# Patient Record
Sex: FEMALE | Race: WHITE | NOT HISPANIC OR LATINO | URBAN - METROPOLITAN AREA
[De-identification: names, ages, dates, MRNs, and addresses within clinical notes are randomized per-mention and may not be internally consistent; named-entity substitution may affect disease eponyms.]

---

## 2019-06-21 PROCEDURE — 88305 TISSUE EXAM BY PATHOLOGIST: CPT | Performed by: INTERNAL MEDICINE

## 2019-06-24 ENCOUNTER — LAB REQUISITION (OUTPATIENT)
Dept: LAB | Facility: HOSPITAL | Age: 69
End: 2019-06-24

## 2019-06-24 DIAGNOSIS — K25.3 ACUTE GASTRIC ULCER WITHOUT HEMORRHAGE OR PERFORATION: ICD-10-CM

## 2019-06-24 DIAGNOSIS — R19.8 OTHER SPECIFIED SYMPTOMS AND SIGNS INVOLVING THE DIGESTIVE SYSTEM AND ABDOMEN: ICD-10-CM

## 2019-06-24 DIAGNOSIS — R10.32 LEFT LOWER QUADRANT PAIN: ICD-10-CM

## 2019-06-24 DIAGNOSIS — R19.7 DIARRHEA: ICD-10-CM

## 2019-06-24 DIAGNOSIS — R13.10 DYSPHAGIA: ICD-10-CM

## 2019-06-25 LAB
LAB AP CASE REPORT: NORMAL
LAB AP DIAGNOSIS COMMENT: NORMAL
PATH REPORT.FINAL DX SPEC: NORMAL
PATH REPORT.GROSS SPEC: NORMAL

## 2023-08-22 ENCOUNTER — HOSPITAL ENCOUNTER (EMERGENCY)
Facility: HOSPITAL | Age: 73
Discharge: HOME | End: 2023-08-23
Attending: EMERGENCY MEDICINE
Payer: MEDICARE

## 2023-08-22 DIAGNOSIS — I10 HYPERTENSION, UNSPECIFIED TYPE: Primary | ICD-10-CM

## 2023-08-22 LAB
ALBUMIN SERPL-MCNC: 4.6 G/DL (ref 3.5–5.2)
ALBUMIN/GLOB SERPL: 2 G/DL
ALP SERPL-CCNC: 57 U/L (ref 39–117)
ALT SERPL W P-5'-P-CCNC: 17 U/L (ref 1–33)
ANION GAP SERPL CALCULATED.3IONS-SCNC: 10.5 MMOL/L (ref 5–15)
AST SERPL-CCNC: 19 U/L (ref 1–32)
BASOPHILS # BLD AUTO: 0.07 10*3/MM3 (ref 0–0.2)
BASOPHILS NFR BLD AUTO: 0.9 % (ref 0–1.5)
BILIRUB SERPL-MCNC: 0.5 MG/DL (ref 0–1.2)
BUN SERPL-MCNC: 10 MG/DL (ref 8–23)
BUN/CREAT SERPL: 15.4 (ref 7–25)
CALCIUM SPEC-SCNC: 10.6 MG/DL (ref 8.6–10.5)
CHLORIDE SERPL-SCNC: 93 MMOL/L (ref 98–107)
CO2 SERPL-SCNC: 28.5 MMOL/L (ref 22–29)
CREAT SERPL-MCNC: 0.65 MG/DL (ref 0.57–1)
DEPRECATED RDW RBC AUTO: 41.7 FL (ref 37–54)
EGFRCR SERPLBLD CKD-EPI 2021: 94.3 ML/MIN/1.73
EOSINOPHIL # BLD AUTO: 0.06 10*3/MM3 (ref 0–0.4)
EOSINOPHIL NFR BLD AUTO: 0.8 % (ref 0.3–6.2)
ERYTHROCYTE [DISTWIDTH] IN BLOOD BY AUTOMATED COUNT: 11.9 % (ref 12.3–15.4)
GLOBULIN UR ELPH-MCNC: 2.3 GM/DL
GLUCOSE SERPL-MCNC: 104 MG/DL (ref 65–99)
HCT VFR BLD AUTO: 42.7 % (ref 34–46.6)
HGB BLD-MCNC: 14.1 G/DL (ref 12–15.9)
IMM GRANULOCYTES # BLD AUTO: 0.01 10*3/MM3 (ref 0–0.05)
IMM GRANULOCYTES NFR BLD AUTO: 0.1 % (ref 0–0.5)
LYMPHOCYTES # BLD AUTO: 1.94 10*3/MM3 (ref 0.7–3.1)
LYMPHOCYTES NFR BLD AUTO: 25.1 % (ref 19.6–45.3)
MCH RBC QN AUTO: 31 PG (ref 26.6–33)
MCHC RBC AUTO-ENTMCNC: 33 G/DL (ref 31.5–35.7)
MCV RBC AUTO: 93.8 FL (ref 79–97)
MONOCYTES # BLD AUTO: 1.08 10*3/MM3 (ref 0.1–0.9)
MONOCYTES NFR BLD AUTO: 14 % (ref 5–12)
NEUTROPHILS NFR BLD AUTO: 4.58 10*3/MM3 (ref 1.7–7)
NEUTROPHILS NFR BLD AUTO: 59.1 % (ref 42.7–76)
NT-PROBNP SERPL-MCNC: 321.9 PG/ML (ref 0–900)
PLATELET # BLD AUTO: 377 10*3/MM3 (ref 140–450)
PMV BLD AUTO: 8.6 FL (ref 6–12)
POTASSIUM SERPL-SCNC: 3.3 MMOL/L (ref 3.5–5.2)
PROT SERPL-MCNC: 6.9 G/DL (ref 6–8.5)
RBC # BLD AUTO: 4.55 10*6/MM3 (ref 3.77–5.28)
SODIUM SERPL-SCNC: 132 MMOL/L (ref 136–145)
TROPONIN T SERPL HS-MCNC: 9 NG/L
WBC NRBC COR # BLD: 7.74 10*3/MM3 (ref 3.4–10.8)

## 2023-08-22 PROCEDURE — 84484 ASSAY OF TROPONIN QUANT: CPT | Performed by: EMERGENCY MEDICINE

## 2023-08-22 PROCEDURE — 80053 COMPREHEN METABOLIC PANEL: CPT | Performed by: EMERGENCY MEDICINE

## 2023-08-22 PROCEDURE — 85025 COMPLETE CBC W/AUTO DIFF WBC: CPT | Performed by: EMERGENCY MEDICINE

## 2023-08-22 PROCEDURE — 99283 EMERGENCY DEPT VISIT LOW MDM: CPT

## 2023-08-22 PROCEDURE — 83880 ASSAY OF NATRIURETIC PEPTIDE: CPT | Performed by: EMERGENCY MEDICINE

## 2023-08-22 PROCEDURE — 36415 COLL VENOUS BLD VENIPUNCTURE: CPT

## 2023-08-22 RX ORDER — CITALOPRAM 20 MG/1
20 TABLET ORAL DAILY
COMMUNITY
Start: 2023-07-24

## 2023-08-22 RX ORDER — MECLIZINE HYDROCHLORIDE 25 MG/1
25 TABLET ORAL ONCE
Status: COMPLETED | OUTPATIENT
Start: 2023-08-22 | End: 2023-08-22

## 2023-08-22 RX ORDER — PANTOPRAZOLE SODIUM 40 MG/10ML
INJECTION, POWDER, LYOPHILIZED, FOR SOLUTION INTRAVENOUS
COMMUNITY
End: 2023-08-28

## 2023-08-22 RX ORDER — PANTOPRAZOLE SODIUM 40 MG/1
TABLET, DELAYED RELEASE ORAL
COMMUNITY
Start: 2023-04-26

## 2023-08-22 RX ORDER — METOPROLOL SUCCINATE 25 MG/1
25 TABLET, EXTENDED RELEASE ORAL DAILY
COMMUNITY
Start: 2023-08-04

## 2023-08-22 RX ORDER — LEVETIRACETAM 500 MG/1
500 TABLET, EXTENDED RELEASE ORAL DAILY
COMMUNITY
Start: 2023-06-03

## 2023-08-22 RX ORDER — DILTIAZEM HYDROCHLORIDE 240 MG/1
240 CAPSULE, COATED, EXTENDED RELEASE ORAL DAILY
COMMUNITY
Start: 2023-06-08 | End: 2023-08-28

## 2023-08-22 RX ORDER — POTASSIUM CHLORIDE 1.5 G/1.58G
40 POWDER, FOR SOLUTION ORAL ONCE
Status: COMPLETED | OUTPATIENT
Start: 2023-08-22 | End: 2023-08-22

## 2023-08-22 RX ORDER — GABAPENTIN 300 MG/1
300 CAPSULE ORAL DAILY
COMMUNITY
Start: 2023-06-07

## 2023-08-22 RX ORDER — LEVOTHYROXINE SODIUM 0.07 MG/1
TABLET ORAL
COMMUNITY
Start: 2023-07-05

## 2023-08-22 RX ADMIN — POTASSIUM CHLORIDE 40 MEQ: 1.5 POWDER, FOR SOLUTION ORAL at 22:30

## 2023-08-22 RX ADMIN — MECLIZINE HYDROCHLORIDE 25 MG: 25 TABLET ORAL at 23:42

## 2023-08-23 VITALS
OXYGEN SATURATION: 96 % | BODY MASS INDEX: 24.24 KG/M2 | TEMPERATURE: 97.4 F | DIASTOLIC BLOOD PRESSURE: 93 MMHG | HEIGHT: 64 IN | HEART RATE: 75 BPM | RESPIRATION RATE: 16 BRPM | WEIGHT: 142 LBS | SYSTOLIC BLOOD PRESSURE: 161 MMHG

## 2023-08-23 LAB
GEN 5 2HR TROPONIN T REFLEX: 10 NG/L
TROPONIN T DELTA: 1 NG/L

## 2023-08-23 RX ORDER — MECLIZINE HYDROCHLORIDE 25 MG/1
12.5 TABLET ORAL 3 TIMES DAILY PRN
Qty: 15 TABLET | Refills: 0 | Status: SHIPPED | OUTPATIENT
Start: 2023-08-23

## 2023-08-23 NOTE — DISCHARGE INSTRUCTIONS
Keep a blood pressure diary. Follow-up with Dr Guardado for further evaluation. Return if problems.

## 2023-08-23 NOTE — FSED PROVIDER NOTE
Subjective   History of Present Illness  73 yof complains of high blood pressure. She reports her blood pressure was well controlled until a few months ago when her PCP changed her medications. She is unsure why her medication was changed. She denies headache, nausea, chest pain, shortness of breath, fatigue, or weakness. She recently saw an oncologist and may have cancer. She does not know what kind at this time and is still undergoing testing.     Review of Systems   Constitutional: Negative.    HENT: Negative.     Respiratory: Negative.     Cardiovascular: Negative.    Musculoskeletal: Negative.    Skin: Negative.    Neurological: Negative.    All other systems reviewed and are negative.    Past Medical History:   Diagnosis Date    Hypertension        No Known Allergies    History reviewed. No pertinent surgical history.    History reviewed. No pertinent family history.    Social History     Socioeconomic History    Marital status: Unknown   Tobacco Use    Smoking status: Former     Types: Cigarettes           Objective   Physical Exam  Vitals reviewed.   Constitutional:       Appearance: Normal appearance.   HENT:      Head: Normocephalic and atraumatic.      Right Ear: Tympanic membrane, ear canal and external ear normal.      Left Ear: Tympanic membrane, ear canal and external ear normal.      Nose: Nose normal.      Mouth/Throat:      Mouth: Mucous membranes are moist.      Pharynx: Oropharynx is clear.   Eyes:      Extraocular Movements: Extraocular movements intact.      Pupils: Pupils are equal, round, and reactive to light.   Cardiovascular:      Rate and Rhythm: Normal rate and regular rhythm.      Pulses: Normal pulses.      Heart sounds: Normal heart sounds.   Pulmonary:      Effort: Pulmonary effort is normal.      Breath sounds: Normal breath sounds.   Musculoskeletal:         General: Normal range of motion.      Cervical back: Normal range of motion and neck supple.   Skin:     General: Skin is  warm and dry.      Capillary Refill: Capillary refill takes less than 2 seconds.   Neurological:      General: No focal deficit present.      Mental Status: She is alert and oriented to person, place, and time.       Procedures           ED Course  ED Course as of 08/23/23 0246   Wed Aug 23, 2023   0058 Discussed test results and treatment plan with patient and family.  [BM]      ED Course User Index  [BM] Angelica Pichardo MD                                           Medical Decision Making  Patient presents to the emergency department complaining of high blood pressure. Patient is otherwise asymptomatic without confusion, chest pain, dysuria, vision changes, focal neurological deficit or SOB. Patient is hypertensive here. Doubt hypertenstive emergency, patient with no signs of AMS, pulmonary edema, heart failure, ACS, PRESS syndrome, intracranial hemorrhage, renal infarction or failure or other end organ damage. Plan to discharge patient home with PMD follow up.    Problems Addressed:  Hypertension, unspecified type: complicated acute illness or injury    Amount and/or Complexity of Data Reviewed  Labs: ordered.    Risk  Prescription drug management.        Final diagnoses:   Hypertension, unspecified type       ED Disposition  ED Disposition       ED Disposition   Discharge    Condition   Stable    Comment   --               PATIENT CONNECTION - [facility] [[email or web address]] [id number]  [phone]        Brady Guardado MD  [address] [facility] [place] [address]  [phone]               Medication List        New Prescriptions      meclizine 25 MG tablet  Commonly known as: ANTIVERT  Take 0.5 tablets by mouth 3 (Three) Times a Day As Needed for Dizziness.               Where to Get Your Medications        These medications were sent to [facility]/pharmacy #[id number] - [place], [place] - [address] [facility] - [phone]  - [phone] [address] [place] [address]      Hours: 24-hours Phone:  [phone]   meclizine 25 MG tablet

## 2023-08-28 ENCOUNTER — OFFICE VISIT (OUTPATIENT)
Dept: CARDIOLOGY | Facility: CLINIC | Age: 73
End: 2023-08-28
Payer: MEDICARE

## 2023-08-28 ENCOUNTER — TELEPHONE (OUTPATIENT)
Dept: CARDIOLOGY | Facility: CLINIC | Age: 73
End: 2023-08-28

## 2023-08-28 VITALS
HEART RATE: 65 BPM | OXYGEN SATURATION: 98 % | WEIGHT: 142 LBS | BODY MASS INDEX: 24.24 KG/M2 | DIASTOLIC BLOOD PRESSURE: 93 MMHG | HEIGHT: 64 IN | SYSTOLIC BLOOD PRESSURE: 142 MMHG

## 2023-08-28 DIAGNOSIS — R09.89 LABILE HYPERTENSION: Primary | ICD-10-CM

## 2023-08-28 DIAGNOSIS — E83.52 HYPERCALCEMIA: ICD-10-CM

## 2023-08-28 PROCEDURE — 93000 ELECTROCARDIOGRAM COMPLETE: CPT | Performed by: INTERNAL MEDICINE

## 2023-08-28 PROCEDURE — 1160F RVW MEDS BY RX/DR IN RCRD: CPT | Performed by: INTERNAL MEDICINE

## 2023-08-28 PROCEDURE — 99204 OFFICE O/P NEW MOD 45 MIN: CPT | Performed by: INTERNAL MEDICINE

## 2023-08-28 PROCEDURE — 1159F MED LIST DOCD IN RCRD: CPT | Performed by: INTERNAL MEDICINE

## 2023-08-28 RX ORDER — LOSARTAN POTASSIUM 100 MG/1
100 TABLET ORAL DAILY
Qty: 30 TABLET | Refills: 11 | Status: SHIPPED | OUTPATIENT
Start: 2023-08-28 | End: 2023-08-28

## 2023-08-28 RX ORDER — ERGOCALCIFEROL 1.25 MG/1
50000 CAPSULE ORAL
COMMUNITY
Start: 2023-08-23

## 2023-08-28 RX ORDER — MELATONIN
2000 DAILY
COMMUNITY
Start: 2023-08-23

## 2023-08-28 RX ORDER — LOSARTAN POTASSIUM 100 MG/1
100 TABLET ORAL DAILY
Qty: 90 TABLET | Refills: 3 | Status: SHIPPED | OUTPATIENT
Start: 2023-08-28

## 2023-08-28 NOTE — TELEPHONE ENCOUNTER
Rx Refill Note  Requested Prescriptions     Signed Prescriptions Disp Refills    losartan (COZAAR) 100 MG tablet 90 tablet 3     Sig: TAKE 1 TABLET BY MOUTH DAILY     Authorizing Provider: HAI PEREZ     Ordering User: MICAH NAVARRO      Last office visit with prescribing clinician: 8/28/2023   Last telemedicine visit with prescribing clinician: Visit date not found   Next office visit with prescribing clinician: 10/2/2023                           Micah Navarro MA  08/28/23, 13:17 EDT

## 2023-08-28 NOTE — PROGRESS NOTES
Cardiology Consult Note    Patient Identification:  Name: Maryellen Dumont  Age: 73 y.o.  Sex: female  :  1950  MRN: [id number]               History of Present Illness:        Assessment:  :      Recommendations / Plan:                   Diagnosis Plan   1. Labile hypertension  Ambulatory Referral to Endocrinology    Basic Metabolic Panel    ECG 12 Lead      2. Hypercalcemia  Ambulatory Referral to Endocrinology    Basic Metabolic Panel    ECG 12 Lead                 Past Medical History:  Past Medical History:   Diagnosis Date    Hypertension      Past Surgical History:  History reviewed. No pertinent surgical history.   Allergies:  No Known Allergies  Home Meds:  (Not in a hospital admission)    Current Meds:     Current Outpatient Medications:     Cholecalciferol 25 MCG (1000 UT) tablet, Take 2 tablets by mouth Daily., Disp: , Rfl:     citalopram (CeleXA) 20 MG tablet, Take 1 tablet by mouth Daily., Disp: , Rfl:     gabapentin (NEURONTIN) 300 MG capsule, Take 1 capsule by mouth Daily., Disp: , Rfl:     levETIRAcetam XR (KEPPRA XR) 500 MG 24 hr tablet, Take 1 tablet by mouth Daily., Disp: , Rfl:     levothyroxine (SYNTHROID, LEVOTHROID) 75 MCG tablet, TAKE 1 TABLET BY MOUTH EVERY DAY IN THE MORNING ON AN EMPTY STOMACH, Disp: , Rfl:     meclizine (ANTIVERT) 25 MG tablet, Take 0.5 tablets by mouth 3 (Three) Times a Day As Needed for Dizziness., Disp: 15 tablet, Rfl: 0    metoprolol succinate XL (TOPROL-XL) 25 MG 24 hr tablet, Take 1 tablet by mouth Daily., Disp: , Rfl:     pantoprazole (PROTONIX) 40 MG EC tablet, , Disp: , Rfl:     vitamin D (ERGOCALCIFEROL) 1.25 MG (06819 UT) capsule capsule, Take 1 capsule by mouth Every 7 (Seven) Days., Disp: , Rfl:     losartan (Cozaar) 100 MG tablet, Take 1 tablet by mouth Daily., Disp: 30 tablet, Rfl: 11  Social History:   Social History     Tobacco Use    Smoking status: Former     Types: Cigarettes    Smokeless tobacco: Never   Substance Use Topics    Alcohol use:  "Not on file      Family History:  History reviewed. No pertinent family history.     Review of Systems : Review of Systems   Constitutional: Negative for malaise/fatigue.   Cardiovascular:  Positive for leg swelling. Negative for chest pain, dyspnea on exertion and palpitations.   Respiratory:  Negative for cough and shortness of breath.    Gastrointestinal:  Negative for abdominal pain, nausea and vomiting.   Neurological:  Positive for dizziness and light-headedness. Negative for focal weakness, headaches and numbness.   All other systems reviewed and are negative.             Constitutional:  Heart Rate:  [65] 65  BP: (142-164)/(90-93) 142/93    Physical Exam   /93 (BP Location: Right arm, Patient Position: Sitting)   Pulse 65   Ht 162.6 cm (64\")   Wt 64.4 kg (142 lb)   SpO2 98%   BMI 24.37 kg/mý   Physical Exam  General:  Appears in no acute distress  Eyes: Sclerae are anicteric,  conjunctivae are clear   HEENT:  No JVD. Thyroid not visibly enlarged. No mucosal pallor or cyanosis  Respiratory: Respirations regular and unlabored at rest.  Bilaterally good breath sounds with good air entry in all fields. No crackles, rubs or wheezes auscultated  Cardiovascular: S1,S2 Regular rate and rhythm. No murmur, rub or gallop auscultated. No pretibial pitting edema  Gastrointestinal: Abdomen soft, flat, nontender. Bowel sounds present.   Musculoskeletal:  No abnormal movements  Extremities: No digital clubbing or cyanosis  Skin: Color pink. Skin warm and dry to touch. No rashes  No xanthoma  Neuro: Alert and awake, no lateralizing deficits appreciated    Cardiographics  ECG: EKG tracing was  personally reviewed/interpreted by me      Telemetry:     Echocardiogram:       Imaging  Chest X-ray:   Imaging Results (Last 24 Hours)       ** No results found for the last 24 hours. **            Lab Review: I have reviewed the labs  Results from last 7 days   Lab Units 08/22/23  3596 08/22/23  2140   HSTROP T ng/L 10* 9 "         Results from last 7 days   Lab Units 08/22/23  2140   SODIUM mmol/L 132*   POTASSIUM mmol/L 3.3*   BUN mg/dL 10   CREATININE mg/dL 0.65   CALCIUM mg/dL 10.6*         Results from last 7 days   Lab Units 08/22/23  2140   PROBNP pg/mL 321.9     Results from last 7 days   Lab Units 08/22/23  2140 08/22/23  1228   WBC 10*3/mm3 7.74 6.74   HEMOGLOBIN g/dL 14.1 14.3   HEMATOCRIT % 42.7 41.1   PLATELETS 10*3/mm3 377 374                 Brady Guardado MD  8/28/2023, 11:17 EDT      EMR Dragon/Transcription:   Dictated utilizing Dragon dictation

## 2023-08-28 NOTE — PROGRESS NOTES
Cardiology Consult Note    Patient Identification:  Name: Maryellen Dumont  Age: 73 y.o.  Sex: female  :  1950  MRN: [id number]             Requesting Physician :  Felecia Schwartz APRN     Reason for Consultation / Chief Complaint :   Hypertension    History of Present Illness:      Ms. Maryellen Dumont has PMH of    Hypertension  Neuropathy      Here for evaluation of labile hypertension.  Patient's PMD recently retired used to be on olmesartan and her blood pressure medications was changed to diltiazem and metoprolol is having labile blood pressure.  Patient also has hypercalcemia and elevated PTH.      It was an emergency room 2023 with labile hypertension.      Labs from 2023 reveal CBC with a hemoglobin of 14.  CMP with a sodium 132, potassium 3.3, calcium 10.6.  proBNP normal at 321.  HS troponin normal at 9.  Homocysteine normal at 9.        Assessment:  :    Labile hypertension  Hypercalcemia      Recommendations / Plan:        Reviewed EKG results with patient.  Will discontinue diltiazem put her on losartan.  We will check a BNP and follow-up.  We will send her to endocrinology to evaluate and treat hypercalcemia and elevated PTH.  Discussed with patient and her .           Diagnosis Plan   1. Labile hypertension  Ambulatory Referral to Endocrinology    Basic Metabolic Panel      2. Hypercalcemia  Ambulatory Referral to Endocrinology    Basic Metabolic Panel                 Past Medical History:  Past Medical History:   Diagnosis Date    Hypertension      Past Surgical History:  History reviewed. No pertinent surgical history.   Allergies:  No Known Allergies  Home Meds:    Current Meds:     Current Outpatient Medications:     Cholecalciferol 25 MCG (1000 UT) tablet, Take 2 tablets by mouth Daily., Disp: , Rfl:     citalopram (CeleXA) 20 MG tablet, Take 1 tablet by mouth Daily., Disp: , Rfl:     gabapentin (NEURONTIN) 300 MG capsule, Take 1 capsule by mouth Daily., Disp: , Rfl:      "levETIRAcetam XR (KEPPRA XR) 500 MG 24 hr tablet, Take 1 tablet by mouth Daily., Disp: , Rfl:     levothyroxine (SYNTHROID, LEVOTHROID) 75 MCG tablet, TAKE 1 TABLET BY MOUTH EVERY DAY IN THE MORNING ON AN EMPTY STOMACH, Disp: , Rfl:     meclizine (ANTIVERT) 25 MG tablet, Take 0.5 tablets by mouth 3 (Three) Times a Day As Needed for Dizziness., Disp: 15 tablet, Rfl: 0    metoprolol succinate XL (TOPROL-XL) 25 MG 24 hr tablet, Take 1 tablet by mouth Daily., Disp: , Rfl:     pantoprazole (PROTONIX) 40 MG EC tablet, , Disp: , Rfl:     vitamin D (ERGOCALCIFEROL) 1.25 MG (05067 UT) capsule capsule, Take 1 capsule by mouth Every 7 (Seven) Days., Disp: , Rfl:     losartan (Cozaar) 100 MG tablet, Take 1 tablet by mouth Daily., Disp: 30 tablet, Rfl: 11  Social History:   Social History     Tobacco Use    Smoking status: Former     Types: Cigarettes    Smokeless tobacco: Never   Substance Use Topics    Alcohol use: Not on file      Family History:  History reviewed. No pertinent family history.     Review of Systems : Review of Systems   All other systems reviewed and are negative.             Constitutional:  Heart Rate:  [65] 65  BP: (142-164)/(90-93) 142/93    Physical Exam   /93 (BP Location: Right arm, Patient Position: Sitting)   Pulse 65   Ht 162.6 cm (64\")   Wt 64.4 kg (142 lb)   SpO2 98%   BMI 24.37 kg/mý   Physical Exam  General:  Appears in no acute distress  Eyes: Sclerae are anicteric,  conjunctivae are clear   HEENT:  No JVD. Thyroid not visibly enlarged. No mucosal pallor or cyanosis  Respiratory: Respirations regular and unlabored at rest.  Bilaterally good breath sounds with good air entry in all fields. No crackles, rubs or wheezes auscultated  Cardiovascular: S1,S2 Regular rate and rhythm. No murmur, rub or gallop auscultated. No pretibial pitting edema  Gastrointestinal: Abdomen soft, flat, nontender. Bowel sounds present.   Musculoskeletal:  No abnormal movements  Extremities: No digital " clubbing or cyanosis  Skin: Color pink. Skin warm and dry to touch. No rashes  No xanthoma  Neuro: Alert and awake, no lateralizing deficits appreciated    Cardiographics  ECG: EKG tracing was  personally reviewed/interpreted by me    ECG 12 Lead    Date/Time: 8/28/2023 11:15 AM  Performed by: Brady Guardado MD  Authorized by: Brady Guardado MD   Comparison: not compared with previous ECG   Previous ECG: no previous ECG available  Comments: EKG done today reviewed/interpreted by me reveals sinus rhythm with rate of 65 bpm with Q waves in V1 V2, no no comparison EKG available.           Imaging  Chest X-ray:   Imaging Results (Last 24 Hours)       ** No results found for the last 24 hours. **            Lab Review: I have reviewed the labs  Results from last 7 days   Lab Units 08/22/23  2356 08/22/23  2140   HSTROP T ng/L 10* 9         Results from last 7 days   Lab Units 08/22/23  2140   SODIUM mmol/L 132*   POTASSIUM mmol/L 3.3*   BUN mg/dL 10   CREATININE mg/dL 0.65   CALCIUM mg/dL 10.6*         Results from last 7 days   Lab Units 08/22/23  2140   PROBNP pg/mL 321.9     Results from last 7 days   Lab Units 08/22/23  2140 08/22/23  1228   WBC 10*3/mm3 7.74 6.74   HEMOGLOBIN g/dL 14.1 14.3   HEMATOCRIT % 42.7 41.1   PLATELETS 10*3/mm3 377 374                 Brady Guardado MD  8/28/2023, 11:15 EDT      EMR Dragon/Transcription:   Dictated utilizing Dragon dictation

## 2023-08-31 ENCOUNTER — PATIENT ROUNDING (BHMG ONLY) (OUTPATIENT)
Dept: CARDIOLOGY | Facility: CLINIC | Age: 73
End: 2023-08-31
Payer: MEDICARE

## 2023-08-31 NOTE — PROGRESS NOTES
A My-Chart message has been sent to the patient for PATIENT ROUNDING with [facility]

## 2023-09-05 ENCOUNTER — TELEPHONE (OUTPATIENT)
Dept: CARDIOLOGY | Facility: CLINIC | Age: 73
End: 2023-09-05
Payer: MEDICARE

## 2023-09-05 NOTE — TELEPHONE ENCOUNTER
----- Message from Cuca Navarro MA sent at 9/5/2023  8:52 AM EDT -----  Regarding: FW: Question regarding ECG 12-LEAD  Contact: [phone]    ----- Message -----  From: Tim Maryellen TILLMAN  Sent: 9/4/2023   7:39 PM EDT  To: Tessa Cabrera [facility] [place]  Subject: Question regarding ECG 12-LEAD                   After a week on losartan Maryellen’s BP seems to have come down some but  it is still above normal, about 150/96 at times.  Maryellen’s previous doctor, Dr. Abhijeet Diaz - now retired prescribed a BP medication called Clonidine 0.1 tablet to take when her BP spiked.  She only took it when her BP spiked over 180/110.  After taking Clonidine her BP always went back to normal rates.  When talking with Maryellen’s brother the weekend he says this is his BP medication (Clonidine) he takes daily and his BP stays near 120/80.     I thought Dr Guardado needed to know this.  We failed to tell him this at her appointment Aug 28th.  As I said earlier Maryellen’s BP is better after a week of losartan but it is still running a little high but better than it was before.  Dr. Diaz prescribed 60 pills of Clonidine to have on hand in 2020 and Maryellen still has 4 tablets.  I thought it interesting that  her bother takes Clonidine as his main BP medication.    Just more information for Dr. Guardado to consider.  Hope this information helps.    Maryellen Dumont / David Dumont  1950   #[id number]  [phone]

## 2023-09-06 RX ORDER — CLONIDINE HYDROCHLORIDE 0.1 MG/1
0.1 TABLET ORAL 2 TIMES DAILY
COMMUNITY
End: 2023-09-06

## 2023-09-06 RX ORDER — CLONIDINE HYDROCHLORIDE 0.1 MG/1
0.1 TABLET ORAL DAILY
COMMUNITY
End: 2023-09-06 | Stop reason: SDUPTHER

## 2023-09-06 RX ORDER — CLONIDINE HYDROCHLORIDE 0.1 MG/1
0.1 TABLET ORAL DAILY
Qty: 90 TABLET | Refills: 3 | Status: SHIPPED | OUTPATIENT
Start: 2023-09-06

## 2023-09-06 NOTE — TELEPHONE ENCOUNTER
Rx Refill Note  Requested Prescriptions     Pending Prescriptions Disp Refills    cloNIDine (CATAPRES) 0.1 MG tablet 90 tablet 3     Sig: Take 1 tablet by mouth Daily.      Last office visit with prescribing clinician: 8/28/2023   Last telemedicine visit with prescribing clinician: Visit date not found   Next office visit with prescribing clinician: 10/2/2023                         Would you like a call back once the refill request has been completed: [] Yes [] No    If the office needs to give you a call back, can they leave a voicemail: [] Yes [] No    Shanique Medina MA  09/06/23, 11:47 EDT

## 2023-09-22 ENCOUNTER — LAB (OUTPATIENT)
Dept: LAB | Facility: HOSPITAL | Age: 73
End: 2023-09-22
Payer: MEDICARE

## 2023-09-22 DIAGNOSIS — E83.52 HYPERCALCEMIA: ICD-10-CM

## 2023-09-22 DIAGNOSIS — R09.89 LABILE HYPERTENSION: ICD-10-CM

## 2023-09-22 LAB
ANION GAP SERPL CALCULATED.3IONS-SCNC: 11 MMOL/L (ref 5–15)
BUN SERPL-MCNC: 8 MG/DL (ref 8–23)
BUN/CREAT SERPL: 12.3 (ref 7–25)
CALCIUM SPEC-SCNC: 10.1 MG/DL (ref 8.6–10.5)
CHLORIDE SERPL-SCNC: 95 MMOL/L (ref 98–107)
CO2 SERPL-SCNC: 27 MMOL/L (ref 22–29)
CREAT SERPL-MCNC: 0.65 MG/DL (ref 0.57–1)
EGFRCR SERPLBLD CKD-EPI 2021: 94.3 ML/MIN/1.73
GLUCOSE SERPL-MCNC: 103 MG/DL (ref 65–99)
POTASSIUM SERPL-SCNC: 4.7 MMOL/L (ref 3.5–5.2)
SODIUM SERPL-SCNC: 133 MMOL/L (ref 136–145)

## 2023-09-22 PROCEDURE — 36415 COLL VENOUS BLD VENIPUNCTURE: CPT

## 2023-09-22 PROCEDURE — 80048 BASIC METABOLIC PNL TOTAL CA: CPT

## 2023-10-03 PROBLEM — I10 HYPERTENSION: Status: ACTIVE | Noted: 2023-10-03

## 2023-10-03 PROBLEM — D75.839 THROMBOCYTHEMIA: Status: ACTIVE | Noted: 2023-10-03

## 2023-10-03 PROBLEM — R56.9 PARTIAL SEIZURE: Status: ACTIVE | Noted: 2023-10-03

## 2023-10-03 PROBLEM — E03.9 HYPOTHYROIDISM: Status: ACTIVE | Noted: 2023-10-03

## 2023-10-03 PROBLEM — F32.9 MAJOR DEPRESSIVE DISORDER, SINGLE EPISODE, UNSPECIFIED: Status: ACTIVE | Noted: 2023-10-03

## 2023-10-03 PROBLEM — E83.52 HYPERCALCEMIA: Status: ACTIVE | Noted: 2023-08-22

## 2023-10-03 RX ORDER — PANTOPRAZOLE SODIUM 40 MG/1
40 TABLET, DELAYED RELEASE ORAL DAILY
COMMUNITY
Start: 2023-08-25

## 2023-10-04 ENCOUNTER — OFFICE VISIT (OUTPATIENT)
Dept: ENDOCRINOLOGY | Facility: CLINIC | Age: 73
End: 2023-10-04
Payer: MEDICARE

## 2023-10-04 VITALS
WEIGHT: 146 LBS | HEIGHT: 64 IN | SYSTOLIC BLOOD PRESSURE: 130 MMHG | HEART RATE: 56 BPM | OXYGEN SATURATION: 99 % | DIASTOLIC BLOOD PRESSURE: 90 MMHG | BODY MASS INDEX: 24.92 KG/M2

## 2023-10-04 DIAGNOSIS — E55.9 VITAMIN D DEFICIENCY: ICD-10-CM

## 2023-10-04 DIAGNOSIS — E03.9 HYPOTHYROIDISM, UNSPECIFIED TYPE: ICD-10-CM

## 2023-10-04 DIAGNOSIS — E83.52 HYPERCALCEMIA: ICD-10-CM

## 2023-10-04 DIAGNOSIS — I10 HYPERTENSION, UNSPECIFIED TYPE: ICD-10-CM

## 2023-10-04 DIAGNOSIS — E21.0 PRIMARY HYPERPARATHYROIDISM: Primary | ICD-10-CM

## 2023-10-04 PROCEDURE — 99204 OFFICE O/P NEW MOD 45 MIN: CPT | Performed by: INTERNAL MEDICINE

## 2023-10-04 PROCEDURE — 3075F SYST BP GE 130 - 139MM HG: CPT | Performed by: INTERNAL MEDICINE

## 2023-10-04 PROCEDURE — 3080F DIAST BP >= 90 MM HG: CPT | Performed by: INTERNAL MEDICINE

## 2023-10-04 RX ORDER — DEXAMETHASONE 1 MG
1 TABLET ORAL ONCE
Qty: 1 TABLET | Refills: 0 | Status: SHIPPED | OUTPATIENT
Start: 2023-10-04 | End: 2023-10-04

## 2023-10-04 NOTE — PROGRESS NOTES
-----------------------------------------------------------------  ENDOCRINE CLINIC NOTE  -----------------------------------------------------------------        PATIENT NAME: Maryellen Dumont  PATIENT : 1950 AGE: 73 y.o.  MRN NUMBER: [id number]  PRIMARY CARE: Felecia Schwartz APRN    ==========================================================================    CHIEF COMPLAINT: Hyperparathyroidism  DATE OF SERVICE: 10/04/23         ==========================================================================    HPI / SUBJECTIVE    73 y.o. female is seen in the clinic today for evaluation and management of hypercalcemia secondary to primary hyperparathyroidism.  History significant for seizure disorder currently on Keppra medication.    Hyperparathyroidism Hx:     Patient reports that she was found to have high some levels starting-2023.  Highest calcium level reported was 11 with albumin of 4.5, corrected to be 10.6.  Denies any history of kidney stones.  Denies any history of fracture.  Reports to have DEXA scan couple of years back.  No recent DEXA scan.  Denies any previous history of osteoporosis or osteopenia.  Denies any history of cancer.  Denies any previous use of lithium or thiazide diuretics.  Reports to have history of arthritis but no specific bone pains.  Patient reports to have a history of IBS constipation predominant with alternating bowel movements.  Denies any fatigue or memory issues.  Denies any significant height loss.  No underlying history of renal dysfunction.  Denies any use of any calcium supplement.  Patient reports to have good milk intake every day.  Yogurt ice cream twice a week.  Patient is consuming good amount of cheese every day.  Patient completed vitamin D high doses 8 supplement and now currently on vitamin D 1000 units every day.  Denies any family history of osteoporosis.    Hypothyroidism:    Reports that she was diagnosed with hypothyroidism around 8 years  ago.  Currently on levothyroxine replacement therapy 75 mcg p.o. daily.  Patient is currently taking medication in the proper fashion.  Reports to have both cold and hot intolerance.  Patient have history significant for anxiety and depression, currently on citalopram therapy, reports that the medication is helping.  Reports of unwanted facial hairs otherwise denies any active complaint.  Denies any neck swelling.    Hypertension:    She is known to have history of hypertension for last 18-year.  No other family history of premature cardiac issues.  Has any history of CAD or CVA.  Denies any excessive bruising.  Continue on medication including clonidine, losartan and metoprolol.  Following cardiology as outpatient.    ==========================================================================                                                PAST MEDICAL HISTORY    Past Medical History:   Diagnosis Date    Hypertension     Hyponatremia Recent    Hypothyroidism Years ago    Thyroid disease     Vitamin D deficiency Recent       ==========================================================================    PAST SURGICAL HISTORY    Past Surgical History:   Procedure Laterality Date    HYSTERECTOMY  1990       ==========================================================================    FAMILY HISTORY    Family History   Problem Relation Age of Onset    Hypertension Mother     Heart disease Mother     Thyroid disease Father     Stroke Father     Heart disease Brother     Hypertension Brother     Thyroid disease Brother        ==========================================================================    SOCIAL HISTORY    Social History     Socioeconomic History    Marital status:      Spouse name: Oswaldo    Years of education: 14   Tobacco Use    Smoking status: Former     Packs/day: 1.00     Years: 40.00     Pack years: 40.00     Types: Cigarettes     Start date: 1/1/1972     Quit date: 1/1/2012     Years since  quittin.7    Smokeless tobacco: Never    Tobacco comments:     Haven't smoked for 20 years - no more than a pack a day or less   Vaping Use    Vaping Use: Never used   Substance and Sexual Activity    Alcohol use: Yes     Alcohol/week: 7.0 standard drinks     Types: 7 Shots of liquor per week     Comment: Vodka no more than 2 shots a day    Drug use: Never    Sexual activity: Not Currently     Partners: Male     Birth control/protection: None, Hysterectomy       ==========================================================================    MEDICATIONS      Current Outpatient Medications:     pantoprazole (PROTONIX) 40 MG EC tablet, Take 1 tablet by mouth Daily., Disp: , Rfl:     Cholecalciferol 25 MCG (1000 UT) tablet, Take 2 tablets by mouth Daily., Disp: , Rfl:     citalopram (CeleXA) 20 MG tablet, Take 1 tablet by mouth Daily., Disp: , Rfl:     cloNIDine (CATAPRES) 0.1 MG tablet, Take 1 tablet by mouth Daily., Disp: 90 tablet, Rfl: 3    dexAMETHasone (DECADRON) 1 MG tablet, Take 1 tablet by mouth 1 (One) Time for 1 dose. Take dexamethasone 1 mg at 11 PM given night and draw serum cortisol and dexamethasone level next morning at 8 AM., Disp: 1 tablet, Rfl: 0    gabapentin (NEURONTIN) 300 MG capsule, Take 1 capsule by mouth Daily., Disp: , Rfl:     levETIRAcetam XR (KEPPRA XR) 500 MG 24 hr tablet, Take 1 tablet by mouth Daily., Disp: , Rfl:     levothyroxine (SYNTHROID, LEVOTHROID) 75 MCG tablet, TAKE 1 TABLET BY MOUTH EVERY DAY IN THE MORNING ON AN EMPTY STOMACH, Disp: , Rfl:     losartan (COZAAR) 100 MG tablet, TAKE 1 TABLET BY MOUTH DAILY, Disp: 90 tablet, Rfl: 3    metoprolol succinate XL (TOPROL-XL) 25 MG 24 hr tablet, Take 1 tablet by mouth Daily., Disp: , Rfl:     ==========================================================================    ALLERGIES    Allergies   Allergen Reactions    Bacitracin Other (See Comments)        ==========================================================================    OBJECTIVE    Vitals:    10/04/23 1205   BP: 130/90   Pulse: 56   SpO2: 99%     Body mass index is 25.06 kg/m².     General: Alert, cooperative, no acute distress  Thyroid:  no enlargement/tenderness/palpable nodules  Lungs: Clear to auscultation bilaterally, respirations unlabored  Heart: Regular rate and rhythm, S1 and S2 normal, no murmur, rub or gallop  Abdomen: Soft, NT, ND and Bowel sounds Positive  Extremities:  Extremities normal, atraumatic, no cyanosis or edema    ==========================================================================    LAB EVALUATION    Lab Results   Component Value Date    GLUCOSE 103 (H) 09/22/2023    BUN 8 09/22/2023    CREATININE 0.65 09/22/2023    BCR 12.3 09/22/2023    K 4.7 09/22/2023    CO2 27.0 09/22/2023    CALCIUM 10.1 09/22/2023    ALBUMIN 4.6 08/22/2023    AST 19 08/22/2023    ALT 17 08/22/2023     No results found for: HGBA1C  Lab Results   Component Value Date    CREATININE 0.65 09/22/2023     tains abnormal data COMPREHENSIVE METABOLIC PANEL  Order: [id number]  Component  Ref Range & Units 1 mo ago   Sodium  136 - 145 mmol/L 133 Low    Comment: (note)  Excess protein and/or lipids can falsely decrease sodium levels  (pseudo hyponatremia).   Potassium  3.5 - 5.1 mmol/L 3.8   Comment: Falsely elevated potassium can occur in patients with high WBC or platelet counts.   Chloride  98 - 107 mmol/L 97 Low    Comment: (note)  Falsely elevated chloride levels can be seen in patients taking  medications which contain bromide.   Total CO2  22 - 29 mmol/L 26   Anion Gap  5 - 13 (arb'U) 10   Comment: (note)  Calculation- Na - (Cl + CO2)   Glucose  71 - 139 mg/dL 100   Comment: (note)  Reference range based on inpatient hypo/hyperglycemic treatment  levels. A random glucose =/>200 is concerning for poor control.   BUN  10 - 20 mg/dL 8 Low    Creatinine  0.55 - 1.02 mg/dL 0.59   BUN/Creatinine  Ratio  RATIO 13.6   Estimated GFR (Cr)  >60 /1.73 m2 96   Comment: (note)  eGFR calculated based on IDMS traceable, enzymatic creatinine  method using the CKD-EPI 2021 equation.   Total Protein  6.2 - 8.0 g/dL 7.1   Albumin  3.2 - 4.6 g/dL 4.5   Globulin  1.5 - 4.5 g/dL 2.6   A/G Ratio  1.1 - 2.5 RATIO 1.7   Calcium  8.4 - 10.2 mg/dL 11.0 High    Total Bilirubin  0.2 - 1.2 mg/dL 0.7   AST (SGOT)  5 - 34 U/L 19   ALT (SGPT)  0 - 55 U/L 17   Alkaline Phosphatase  40 - 150 U/L 55     Status: Final result       Next appt: 10/05/2023 at 02:30 PM in Cardiology (Brady Guardado MD)    Specimen Information: Fresh Frozen Plasma       Component  Ref Range & Units 1 mo ago   PTH, Intact  8.7 - 77.1 pg/mL 101.0 High    Resulting Agency [facility]        Next appt: 10/05/2023 at 02:30 PM in Cardiology (Brady Guardado MD)    Specimen Comment: Specimen type and source: Serum, Blood       Component  Ref Range & Units 1 mo ago   25 Hydroxy, Vitamin D  >30 ng/mL 12.7 Low    Comment: New methodology as of 7/12/21. Testing performed by Chemiluminescent Immunoassay.   Resulting Agency [facility]        tains abnormal data COMPREHENSIVE METABOLIC PANEL  Order: [id number]  Component  Ref Range & Units 3 wk ago   Sodium  136 - 145 mmol/L 133 Low    Comment: (note)  Excess protein and/or lipids can falsely decrease sodium levels  (pseudo hyponatremia).   Potassium  3.5 - 5.1 mmol/L 4.0   Comment: Falsely elevated potassium can occur in patients with high WBC or platelet counts.   Chloride  98 - 107 mmol/L 99   Comment: (note)  Falsely elevated chloride levels can be seen in patients taking  medications which contain bromide.   Total CO2  22 - 29 mmol/L 27   Anion Gap  5 - 13 (arb'U) 7   Comment: (note)  Calculation- Na - (Cl + CO2)   Glucose  71 - 139 mg/dL 91   Comment: (note)  Reference range based on inpatient hypo/hyperglycemic treatment  levels. A random glucose =/>200 is concerning for poor control.   BUN  10 - 20 mg/dL 10    Creatinine  0.55 - 1.02 mg/dL 0.64   BUN/Creatinine Ratio  RATIO 15.6   Estimated GFR (Cr)  >60 /1.73 m2 94   Comment: (note)  eGFR calculated based on IDMS traceable, enzymatic creatinine  method using the CKD-EPI 2021 equation.   Total Protein  6.2 - 8.0 g/dL 6.4   Albumin  3.2 - 4.6 g/dL 4.1   Globulin  1.5 - 4.5 g/dL 2.3   A/G Ratio  1.1 - 2.5 RATIO 1.8   Calcium  8.4 - 10.2 mg/dL 10.1   Total Bilirubin  0.2 - 1.2 mg/dL 0.9   AST (SGOT)  5 - 34 U/L 16   ALT (SGPT)  0 - 55 U/L 9   Alkaline Phosphatase  40 - 150 U/L 60   Resulting Agency [facility]   Specimen Collected: 09/13/23 12:05 Last Resulted: 09/13/23 12:30   Received From: [facility]  Result Received: 09/22/23 15:13     Next appt: 10/05/2023 at 02:30 PM in Cardiology (Brady Guardado MD)    Specimen Information: Fresh Frozen Plasma        Component  Ref Range & Units 3 wk ago 1 mo ago   PTH, Intact  8.7 - 77.1 pg/mL 108.2 High  101.0 High    Resulting [place] [facility]              Specimen Collected: 09/13/23 12:05 EDT Last Resulted: 09/13/23 19:35 EDT   Received From: [facility]  Result Received: 09/22/23 15:13             ==========================================================================    ASSESSMENT AND PLAN    #Hypercalcemia secondary to primary hyperparathyroidism  #Vitamin D deficiency  - Most likely patient initially had secondary hyperparathyroidism due to vitamin D deficiency now at converting to primary hyperparathyroidism  - Patient calcium level is now within acceptable limit  - Counseled patient to maintain good calcium intake through dietary dairy products around 1200 mg/day to which she verbalized understanding  - Currently on vitamin D supplementation 1000 units/day, treated with high-dose vitamin D for vitamin D deficiency, will recheck vitamin D levels  - Otherwise counseled patient to maintain good hydration between 4 to 6 glasses of water every day  - Order DEXA scan  - Ordered nuclear  "medicine parathyroid scan as well    #Hypothyroidism  - Patient currently on levothyroxine replacement therapy 75 mcg p.o. daily, patient is taking medication in proper way  - Most likely patient have Hashimoto thyroiditis, will confirm with the TPO levels  - Continue same replacement therapy and thyroid work-up ordered including TSH and free T4, adjust therapy if needed    #Hypertension, labile  - Patient does not have diagnosis of resistant hypertension as she is not currently maintained on any diuretic therapy  - If there is a concern for secondary hypertension will order the work-up  - Patient does not have clinical signs for any secondary hypertension at this time  - Serum sodium is either normal or on the lower side that rules out any primary hyperaldosteronism, will confirm with the serum renin and aldosterone levels  - Very low chances for patient having pheochromocytoma due to no symptoms associated with it, ordered fractionated catecholamine spot levels  - There is also no clinical signs for Cushing syndrome but ordered DST suppression test for complete evaluation    Patient can complete these blood work and if abnormal will plan for earlier follow-up otherwise patient can follow-up in my clinic in 6 months time when she is back from [place].    Thank you for courtesy of consultation.    Return to clinic: 6 months    Entire assessment and plan was discussed and counseled the patient in detail to which patient verbalized understanding and agreed with care.  Answered all queries and concerns.    This note was created using voice recognition software and is inherently subject to errors including those of syntax and \"sound-alike\" substitutions which may escape proofreading.  In such instances, original meaning may be extrapolated by contextual derivation.    Note: Portions of this note may have been copied from previous notes but documentation have been reviewed and edited as necessary to support clinical " decision making for today's visit.    ==========================================================================    INFORMATION PROVIDED TO PATIENT    Patient Instructions   Please,    - Get fasting blood work tomorrow morning.  Blood work needs to be done first thing in the morning in a fasting state.  Blood draw has to be earlier than 8:30 in the morning.  Plan to be in the office no later than 745.    - For dexamethasone suppression test for tomorrow:  Take dexamethasone 1 mg at 11 PM given night and draw serum cortisol and dexamethasone level next morning at 8 AM.    -Continue levothyroxine 75 mcg p.o. daily.  The pill has to be taken first thing in the morning with a glass of water and no other medication or supplement for next 40 minutes at least.  Please get your repeat blood work done tomorrow and depending on the blood work results if therapy needs to be adjusted you will get a call from the office.  Additionally if you forgot to take the pill please do not take it at later time but rather take 2 tablets the next day.  And then resume your regular therapy.    -Plan for DEXA scan for evaluation of osteoporosis.    - Please plan for nuclear medicine parathyroid scan as well.    Follow-up in my clinic in 6 months time.    Thank you for your visit today.    If you have any questions or concerns please feel free to reach out of the office.       ==========================================================================  Shamar Solares MD  Department of Endocrine, Diabetes and Metabolism  [facility], [place]  ==========================================================================

## 2023-10-04 NOTE — PATIENT INSTRUCTIONS
Please,    - Get fasting blood work tomorrow morning.  Blood work needs to be done first thing in the morning in a fasting state.  Blood draw has to be earlier than 8:30 in the morning.  Plan to be in the office no later than 745.    - For dexamethasone suppression test for tomorrow:  Take dexamethasone 1 mg at 11 PM given night and draw serum cortisol and dexamethasone level next morning at 8 AM.    -Continue levothyroxine 75 mcg p.o. daily.  The pill has to be taken first thing in the morning with a glass of water and no other medication or supplement for next 40 minutes at least.  Please get your repeat blood work done tomorrow and depending on the blood work results if therapy needs to be adjusted you will get a call from the office.  Additionally if you forgot to take the pill please do not take it at later time but rather take 2 tablets the next day.  And then resume your regular therapy.    -Plan for DEXA scan for evaluation of osteoporosis.    - Please plan for nuclear medicine parathyroid scan as well.    Follow-up in my clinic in 6 months time.    Thank you for your visit today.    If you have any questions or concerns please feel free to reach out of the office.

## 2023-10-05 ENCOUNTER — OFFICE VISIT (OUTPATIENT)
Dept: CARDIOLOGY | Facility: CLINIC | Age: 73
End: 2023-10-05
Payer: MEDICARE

## 2023-10-05 ENCOUNTER — LAB (OUTPATIENT)
Dept: LAB | Facility: HOSPITAL | Age: 73
End: 2023-10-05
Payer: MEDICARE

## 2023-10-05 VITALS
HEIGHT: 64 IN | WEIGHT: 144 LBS | SYSTOLIC BLOOD PRESSURE: 142 MMHG | HEART RATE: 60 BPM | BODY MASS INDEX: 24.59 KG/M2 | DIASTOLIC BLOOD PRESSURE: 84 MMHG | OXYGEN SATURATION: 97 %

## 2023-10-05 DIAGNOSIS — R09.89 LABILE HYPERTENSION: Primary | ICD-10-CM

## 2023-10-05 DIAGNOSIS — I10 HYPERTENSION, UNSPECIFIED TYPE: ICD-10-CM

## 2023-10-05 DIAGNOSIS — E21.0 PRIMARY HYPERPARATHYROIDISM: ICD-10-CM

## 2023-10-05 DIAGNOSIS — E03.9 HYPOTHYROIDISM, UNSPECIFIED TYPE: ICD-10-CM

## 2023-10-05 DIAGNOSIS — E83.52 HYPERCALCEMIA: ICD-10-CM

## 2023-10-05 DIAGNOSIS — E55.9 VITAMIN D DEFICIENCY: ICD-10-CM

## 2023-10-05 LAB
25(OH)D3 SERPL-MCNC: 44.2 NG/ML (ref 30–100)
ALBUMIN SERPL-MCNC: 4.4 G/DL (ref 3.5–5.2)
ALBUMIN/GLOB SERPL: 1.8 G/DL
ALP SERPL-CCNC: 61 U/L (ref 39–117)
ALT SERPL W P-5'-P-CCNC: 11 U/L (ref 1–33)
ANION GAP SERPL CALCULATED.3IONS-SCNC: 12.7 MMOL/L (ref 5–15)
AST SERPL-CCNC: 14 U/L (ref 1–32)
BILIRUB SERPL-MCNC: 1 MG/DL (ref 0–1.2)
BUN SERPL-MCNC: 11 MG/DL (ref 8–23)
BUN/CREAT SERPL: 18 (ref 7–25)
CALCIUM SPEC-SCNC: 10.1 MG/DL (ref 8.6–10.5)
CHLORIDE SERPL-SCNC: 97 MMOL/L (ref 98–107)
CO2 SERPL-SCNC: 25.3 MMOL/L (ref 22–29)
CORTIS SERPL-MCNC: 1.41 MCG/DL
CREAT SERPL-MCNC: 0.61 MG/DL (ref 0.57–1)
EGFRCR SERPLBLD CKD-EPI 2021: 95.7 ML/MIN/1.73
GLOBULIN UR ELPH-MCNC: 2.4 GM/DL
GLUCOSE SERPL-MCNC: 92 MG/DL (ref 65–99)
POTASSIUM SERPL-SCNC: 4.6 MMOL/L (ref 3.5–5.2)
PROT SERPL-MCNC: 6.8 G/DL (ref 6–8.5)
SODIUM SERPL-SCNC: 135 MMOL/L (ref 136–145)
T4 FREE SERPL-MCNC: 1.29 NG/DL (ref 0.93–1.7)
TSH SERPL DL<=0.05 MIU/L-ACNC: 1.43 UIU/ML (ref 0.27–4.2)

## 2023-10-05 PROCEDURE — 82088 ASSAY OF ALDOSTERONE: CPT

## 2023-10-05 PROCEDURE — 82533 TOTAL CORTISOL: CPT

## 2023-10-05 PROCEDURE — 80053 COMPREHEN METABOLIC PANEL: CPT

## 2023-10-05 PROCEDURE — 82384 ASSAY THREE CATECHOLAMINES: CPT

## 2023-10-05 PROCEDURE — 84443 ASSAY THYROID STIM HORMONE: CPT

## 2023-10-05 PROCEDURE — 84244 ASSAY OF RENIN: CPT

## 2023-10-05 PROCEDURE — 1159F MED LIST DOCD IN RCRD: CPT | Performed by: INTERNAL MEDICINE

## 2023-10-05 PROCEDURE — 1160F RVW MEDS BY RX/DR IN RCRD: CPT | Performed by: INTERNAL MEDICINE

## 2023-10-05 PROCEDURE — 84439 ASSAY OF FREE THYROXINE: CPT

## 2023-10-05 PROCEDURE — 86376 MICROSOMAL ANTIBODY EACH: CPT

## 2023-10-05 PROCEDURE — 36415 COLL VENOUS BLD VENIPUNCTURE: CPT

## 2023-10-05 PROCEDURE — 82306 VITAMIN D 25 HYDROXY: CPT

## 2023-10-05 PROCEDURE — 3077F SYST BP >= 140 MM HG: CPT | Performed by: INTERNAL MEDICINE

## 2023-10-05 PROCEDURE — 80299 QUANTITATIVE ASSAY DRUG: CPT

## 2023-10-05 PROCEDURE — 99213 OFFICE O/P EST LOW 20 MIN: CPT | Performed by: INTERNAL MEDICINE

## 2023-10-05 PROCEDURE — 3079F DIAST BP 80-89 MM HG: CPT | Performed by: INTERNAL MEDICINE

## 2023-10-05 RX ORDER — CLONIDINE HYDROCHLORIDE 0.1 MG/1
0.1 TABLET ORAL 3 TIMES DAILY
Qty: 90 TABLET | Refills: 3 | Status: SHIPPED | OUTPATIENT
Start: 2023-10-05 | End: 2023-10-06

## 2023-10-05 NOTE — PROGRESS NOTES
Subjective:     Encounter Date:10/05/2023      Patient ID: Maryellen Dumont is a 73 y.o. female.    Chief Complaint and history of present illness:     Follow-up for hypertension, hypercalcemia     History of Present Illness:       Ms. Maryellen Dumont has PMH of     Hypertension  Neuropathy        Here for follow-up.  Patient was seen for evaluation of labile hypertension.  Patient's PMD recently retired used to be on olmesartan and her blood pressure medications was changed to diltiazem and metoprolol is having labile blood pressure.  Patient also has hypercalcemia and elevated PTH.  Patient was put back on losartan metoprolol and as needed clonidine.  Patient is requiring up to 3 clonidine today.        It was an emergency room 8/23/2023 with labile hypertension.        Labs from 8/22/2023 reveal CBC with a hemoglobin of 14.  CMP with a sodium 132, potassium 3.3, calcium 10.6.  proBNP normal at 321.  HS troponin normal at 9.  Homocysteine normal at 9.  Labs from 9/26/2023 revealed normal CBC.  CMP with a sodium of 130.  PTH is elevated.        Assessment:  :     Labile hypertension  Hypercalcemia        Recommendations / Plan:         Advised patient follow-up with endocrinology.  Being worked up for hypertension and hyperparathyroid.  We will continue losartan and metoprolol and change clonidine to 3 times daily.  Discussed with patient and her .  Advised patient check blood pressure at home.  We will follow-up and consider echocardiogram.    Procedures      Copied text in this portion of the note has been reviewed and is accurate as of 10/5/2023  The following portions of the patient's history were reviewed and updated as appropriate: allergies, current medications, past family history, past medical history, past social history, past surgical history and problem list.    Assessment:         MDM       Diagnosis Plan   1. Labile hypertension        2. Hypercalcemia               Plan:               Past  Medical History:  Past Medical History:   Diagnosis Date    C. difficile colitis     Hypertension     Hyponatremia Recent    Hypothyroidism Years ago    Thyroid disease     Vitamin D deficiency Recent     Past Surgical History:  Past Surgical History:   Procedure Laterality Date    APPENDECTOMY      BUNIONECTOMY      CARPAL TUNNEL RELEASE      FACELIFT      HAMMER TOE REPAIR      HYSTERECTOMY  1990    LIPOMA EXCISION      NECK SURGERY      THUMB ARTHROSCOPY        Allergies:  No Known Allergies    Home Meds:  Current Meds:     Current Outpatient Medications:     cholecalciferol (VITAMIN D3) 25 MCG (1000 UT) tablet, Take 2 tablets by mouth Daily., Disp: , Rfl:     citalopram (CeleXA) 20 MG tablet, Take 1 tablet by mouth Daily., Disp: , Rfl:     cloNIDine (CATAPRES) 0.1 MG tablet, Take 1 tablet by mouth 3 (Three) Times a Day., Disp: 90 tablet, Rfl: 3    gabapentin (NEURONTIN) 300 MG capsule, Take 1 capsule by mouth Daily., Disp: , Rfl:     levETIRAcetam XR (KEPPRA XR) 500 MG 24 hr tablet, Take 1 tablet by mouth Daily., Disp: , Rfl:     levothyroxine (SYNTHROID, LEVOTHROID) 75 MCG tablet, TAKE 1 TABLET BY MOUTH EVERY DAY IN THE MORNING ON AN EMPTY STOMACH, Disp: , Rfl:     losartan (COZAAR) 100 MG tablet, TAKE 1 TABLET BY MOUTH DAILY, Disp: 90 tablet, Rfl: 3    metoprolol succinate XL (TOPROL-XL) 25 MG 24 hr tablet, Take 1 tablet by mouth Daily., Disp: , Rfl:     pantoprazole (PROTONIX) 40 MG EC tablet, Take 1 tablet by mouth Daily., Disp: , Rfl:   Social History:   Social History     Tobacco Use    Smoking status: Former     Packs/day: 1.00     Years: 40.00     Pack years: 40.00     Types: Cigarettes     Start date: 1972     Quit date: 2012     Years since quittin.7    Smokeless tobacco: Never    Tobacco comments:     Haven't smoked for 20 years - no more than a pack a day or less   Substance Use Topics    Alcohol use: Yes     Alcohol/week: 7.0 standard drinks     Types: 7 Shots of liquor per week      "Comment: Vodka no more than 2 shots a day      Family History:  Family History   Problem Relation Age of Onset    Hypertension Mother     Heart disease Mother     Thyroid disease Father     Stroke Father     Heart disease Brother     Hypertension Brother     Thyroid disease Brother               Review of Systems   Constitutional: Negative for malaise/fatigue.   Cardiovascular:  Negative for chest pain, leg swelling and palpitations.   Respiratory:  Negative for shortness of breath.    Skin:  Negative for rash.   Neurological:  Negative for dizziness, light-headedness and numbness.   All other systems are negative         Objective:     Physical Exam  /84   Pulse 60   Ht 162.6 cm (64\")   Wt 65.3 kg (144 lb)   SpO2 97%   BMI 24.72 kg/m²   General:  Appears in no acute distress  Eyes: Sclera is anicteric,  conjunctiva is clear   HEENT:  No JVD.  No carotid bruits  Respiratory: Respirations regular and unlabored at rest.  Clear to auscultation  Cardiovascular: S1,S2 Regular rate and rhythm. No murmur, rub or gallop auscultated.   Extremities: No digital clubbing or cyanosis, no edema  Skin: Color pink. Skin warm and dry to touch. No rashes  No xanthoma  Neuro: Alert and awake.    Lab Reviewed:         Brady Guardado MD  10/5/2023 16:59 EDT      EMR Dragon/Transcription:   \"Dictated utilizing Dragon dictation\".        "

## 2023-10-06 LAB — THYROPEROXIDASE AB SERPL-ACNC: 32 IU/ML (ref 0–34)

## 2023-10-06 RX ORDER — CLONIDINE HYDROCHLORIDE 0.1 MG/1
TABLET ORAL
Qty: 270 TABLET | Refills: 3 | Status: SHIPPED | OUTPATIENT
Start: 2023-10-06

## 2023-10-08 LAB — RENIN PLAS-CCNC: 0.34 NG/ML/HR (ref 0.17–5.38)

## 2023-10-10 LAB — ALDOST SERPL-MCNC: 4.1 NG/DL (ref 0–30)

## 2023-10-11 ENCOUNTER — HOSPITAL ENCOUNTER (OUTPATIENT)
Dept: BONE DENSITY | Facility: HOSPITAL | Age: 73
Discharge: HOME | End: 2023-10-11
Admitting: INTERNAL MEDICINE
Payer: MEDICARE

## 2023-10-11 ENCOUNTER — HOSPITAL ENCOUNTER (OUTPATIENT)
Dept: NUCLEAR MEDICINE | Facility: HOSPITAL | Age: 73
Discharge: HOME | End: 2023-10-11
Payer: MEDICARE

## 2023-10-11 DIAGNOSIS — E21.0 PRIMARY HYPERPARATHYROIDISM: ICD-10-CM

## 2023-10-11 PROCEDURE — 77080 DXA BONE DENSITY AXIAL: CPT

## 2023-10-11 PROCEDURE — A9500 TC99M SESTAMIBI: HCPCS | Performed by: INTERNAL MEDICINE

## 2023-10-11 PROCEDURE — 0 TECHNETIUM SESTAMIBI: Performed by: INTERNAL MEDICINE

## 2023-10-11 PROCEDURE — 78070 PARATHYROID PLANAR IMAGING: CPT

## 2023-10-11 RX ADMIN — TECHNETIUM TC 99M SESTAMIBI 1 DOSE: 1 INJECTION INTRAVENOUS at 07:57

## 2023-10-13 LAB — DEXAMETHASONE SERPL-MCNC: 252 NG/DL

## 2023-10-16 LAB
DOPAMINE SERPL-MCNC: <30 PG/ML (ref 0–48)
EPINEPH PLAS-MCNC: <15 PG/ML (ref 0–62)
NOREPINEPH PLAS-MCNC: 432 PG/ML (ref 0–874)

## 2024-06-17 ENCOUNTER — OFFICE VISIT (OUTPATIENT)
Dept: CARDIOLOGY | Facility: CLINIC | Age: 74
End: 2024-06-17
Payer: MEDICARE

## 2024-06-17 VITALS
OXYGEN SATURATION: 98 % | DIASTOLIC BLOOD PRESSURE: 84 MMHG | HEIGHT: 64 IN | SYSTOLIC BLOOD PRESSURE: 122 MMHG | BODY MASS INDEX: 25.61 KG/M2 | HEART RATE: 59 BPM | WEIGHT: 150 LBS

## 2024-06-17 DIAGNOSIS — R09.89 LABILE HYPERTENSION: Primary | ICD-10-CM

## 2024-06-17 DIAGNOSIS — R00.1 BRADYCARDIA: ICD-10-CM

## 2024-06-17 PROCEDURE — 3074F SYST BP LT 130 MM HG: CPT | Performed by: INTERNAL MEDICINE

## 2024-06-17 PROCEDURE — 3079F DIAST BP 80-89 MM HG: CPT | Performed by: INTERNAL MEDICINE

## 2024-06-17 PROCEDURE — 1159F MED LIST DOCD IN RCRD: CPT | Performed by: INTERNAL MEDICINE

## 2024-06-17 PROCEDURE — 1160F RVW MEDS BY RX/DR IN RCRD: CPT | Performed by: INTERNAL MEDICINE

## 2024-06-17 PROCEDURE — 93000 ELECTROCARDIOGRAM COMPLETE: CPT | Performed by: INTERNAL MEDICINE

## 2024-06-17 PROCEDURE — 99213 OFFICE O/P EST LOW 20 MIN: CPT | Performed by: INTERNAL MEDICINE

## 2024-06-17 NOTE — PROGRESS NOTES
Subjective:     Encounter Date:06/17/2024      Patient ID: Maryellen Dumont is a 74 y.o. female.    Chief Complaint and history of present illness:       Follow-up for hypertension, hypercalcemia     History of Present Illness:       Ms. Maryellen Dumont has PMH of     Hypertension  Bradycardia  Neuropathy        Here for follow-up.  Patient was seen for evaluation of labile hypertension.  Patient is currently taking clonidine, metoprolol, losartan and is asymptomatic and is having good blood pressure control reportedly.    Patient's arterial blood pressure is 122/84, heart rate 59, O2 sat of 98% on room air.     Labs from 8/22/2023 reveal CBC with a hemoglobin of 14.  CMP with a sodium 132, potassium 3.3, calcium 10.6.  proBNP normal at 321.  HS troponin normal at 9.  Homocysteine normal at 9.  Labs from 9/26/2023 revealed normal CBC.  CMP with a sodium of 130.  PTH is elevated.  Labs from 10/5/2023 reveal normal TSH and FT4, CMP with sodium 135        Assessment:  :     Labile hypertension  Bradycardia  Hypercalcemia        Recommendations / Plan:         Reviewed EKG results with patient  Continue medical management  Follow-up with PMD  Check labs, patient wants to have it done with PMD  Advised patient check blood pressure at home.  Advised patient to do exercise.  Discussed about knee pain and water aerobics etc. with the patient and her                 ECG 12 Lead    Date/Time: 6/17/2024 4:22 PM  Performed by: Brady Guardado MD    Authorized by: Brady Guardado MD  Comparison: compared with previous ECG from 8/28/2023  Comparison to previous ECG: EKG done today reviewed/interpreted by me reveals sinus bradycardia at the rate of 56 bpm with RSR pattern in V1 V2, no significant change compared EKG from 8/28/2023          Copied text in this portion of the note has been reviewed and is accurate as of 6/17/2024  The following portions of the patient's history were reviewed and updated  as appropriate: allergies, current medications, past family history, past medical history, past social history, past surgical history and problem list.    Assessment:         MDM       Diagnosis Plan   1. Labile hypertension        2. Bradycardia               Plan:               Past Medical History:  Past Medical History:   Diagnosis Date    C. difficile colitis     Hypertension     Hyponatremia Recent    Hypothyroidism Years ago    Thyroid disease     Vitamin D deficiency Recent     Past Surgical History:  Past Surgical History:   Procedure Laterality Date    APPENDECTOMY      BUNIONECTOMY      CARPAL TUNNEL RELEASE      FACELIFT      HAMMER TOE REPAIR      HYSTERECTOMY  1990    LIPOMA EXCISION      NECK SURGERY      THUMB ARTHROSCOPY        Allergies:  No Known Allergies  Home Meds:  Current Meds:     Current Outpatient Medications:     cholecalciferol (VITAMIN D3) 25 MCG (1000 UT) tablet, Take 2 tablets by mouth Daily., Disp: , Rfl:     citalopram (CeleXA) 20 MG tablet, Take 1 tablet by mouth Daily., Disp: , Rfl:     cloNIDine (CATAPRES) 0.1 MG tablet, TAKE 1 TABLET BY MOUTH THREE TIMES DAILY (Patient taking differently: Two in the morning and two in the evening), Disp: 270 tablet, Rfl: 3    gabapentin (NEURONTIN) 300 MG capsule, Take 1 capsule by mouth Daily., Disp: , Rfl:     levETIRAcetam XR (KEPPRA XR) 500 MG 24 hr tablet, Take 1 tablet by mouth Daily., Disp: , Rfl:     levothyroxine (SYNTHROID, LEVOTHROID) 75 MCG tablet, TAKE 1 TABLET BY MOUTH EVERY DAY IN THE MORNING ON AN EMPTY STOMACH, Disp: , Rfl:     losartan (COZAAR) 100 MG tablet, TAKE 1 TABLET BY MOUTH DAILY, Disp: 90 tablet, Rfl: 3    metoprolol succinate XL (TOPROL-XL) 25 MG 24 hr tablet, Take 1 tablet by mouth Daily., Disp: , Rfl:     Misc Natural Products (BEET ROOT PO), Take  by mouth., Disp: , Rfl:     pantoprazole (PROTONIX) 40 MG EC tablet, Take 1 tablet by mouth Daily., Disp: , Rfl:   Social History:   Social History     Tobacco Use     "Smoking status: Former     Current packs/day: 0.00     Average packs/day: 1 pack/day for 40.0 years (40.0 ttl pk-yrs)     Types: Cigarettes     Start date: 1972     Quit date: 2012     Years since quittin.4    Smokeless tobacco: Never    Tobacco comments:     Haven’t smoked for 20 years - no more than a pack a day or less   Substance Use Topics    Alcohol use: Yes     Alcohol/week: 7.0 standard drinks of alcohol     Types: 7 Shots of liquor per week     Comment: Vodka 2 shots a day, more occasionally      Family History:  Family History   Problem Relation Age of Onset    Hypertension Mother     Heart disease Mother         Lived until 92    Thyroid disease Father     Stroke Father     Heart disease Brother         Takes Clonidine 2.0    Hypertension Brother         Takes Clonidine    Thyroid disease Brother               Review of Systems   Constitutional: Negative for malaise/fatigue.   Cardiovascular:  Negative for chest pain, leg swelling and palpitations.   Respiratory:  Negative for shortness of breath.    Skin:  Negative for rash.   Neurological:  Positive for numbness (feet and fingers). Negative for dizziness and light-headedness.     All other systems are negative         Objective:     Physical Exam  /84   Pulse 59   Ht 162.6 cm (64\")   Wt 68 kg (150 lb)   SpO2 98%   BMI 25.75 kg/m²   General:  Appears in no acute distress  Eyes: Sclera is anicteric,  conjunctiva is clear   HEENT:  No JVD.  No carotid bruits  Respiratory: Respirations regular and unlabored at rest.  Clear to auscultation  Cardiovascular: S1,S2 Regular rate and rhythm. .   Extremities: No digital clubbing or cyanosis, no edema  Skin: Color pink. Skin warm and dry to touch. No rashes  No xanthoma  Neuro: Alert and awake.    Lab Reviewed:         Brady Guardado MD  2024 16:25 EDT      EMR Dragon/Transcription:   \"Dictated utilizing Dragon dictation\".        "

## 2024-09-16 RX ORDER — LOSARTAN POTASSIUM 100 MG/1
100 TABLET ORAL DAILY
Qty: 90 TABLET | Refills: 3 | Status: SHIPPED | OUTPATIENT
Start: 2024-09-16

## 2024-12-05 RX ORDER — CLONIDINE HYDROCHLORIDE 0.1 MG/1
TABLET ORAL
Qty: 270 TABLET | Refills: 3 | Status: SHIPPED | OUTPATIENT
Start: 2024-12-05

## 2024-12-05 NOTE — TELEPHONE ENCOUNTER
Rx Refill Note  Requested Prescriptions     Pending Prescriptions Disp Refills    cloNIDine (CATAPRES) 0.1 MG tablet [Pharmacy Med Name: CLONIDINE 0.1MG TABLETS] 270 tablet 3     Sig: TAKE 1 TABLET BY MOUTH THREE TIMES DAILY      Last office visit with prescribing clinician: 6/17/2024   Last telemedicine visit with prescribing clinician: Visit date not found   Next office visit with prescribing clinician: 6/19/2025                         Would you like a call back once the refill request has been completed: [] Yes [] No    If the office needs to give you a call back, can they leave a voicemail: [] Yes [] No    Shanique Medina MA  12/05/24, 13:24 EST

## 2025-01-07 ENCOUNTER — PATIENT MESSAGE (OUTPATIENT)
Dept: CARDIOLOGY | Facility: CLINIC | Age: 75
End: 2025-01-07
Payer: MEDICARE

## 2025-04-14 RX ORDER — METOPROLOL SUCCINATE 25 MG/1
25 TABLET, EXTENDED RELEASE ORAL DAILY
Qty: 90 TABLET | Refills: 0 | Status: SHIPPED | OUTPATIENT
Start: 2025-04-14

## 2025-04-14 NOTE — TELEPHONE ENCOUNTER
Caller: AG LAMB    Relationship: Emergency Contact    Best call back number: [phone]    Requested Prescriptions:   Requested Prescriptions     Pending Prescriptions Disp Refills    metoprolol succinate XL (TOPROL-XL) 25 MG 24 hr tablet       Sig: Take 1 tablet by mouth Daily.        Pharmacy where request should be sent: [id number] - [place], [place] - [address] AT [address] [place] [address] - [phone] [facility] [phone]      Last office visit with prescribing clinician: 6/17/2024   Last telemedicine visit with prescribing clinician: Visit date not found   Next office visit with prescribing clinician: 6/19/2025     Additional details provided by patient:      Does the patient have less than a 3 day supply:  [] Yes  [] No    Would you like a call back once the refill request has been completed: [] Yes [] No    If the office needs to give you a call back, can they leave a voicemail: [] Yes [] No    Graeme West Rep   04/14/25 14:40 EDT

## 2025-04-14 NOTE — TELEPHONE ENCOUNTER
Rx Refill Note  Requested Prescriptions     Pending Prescriptions Disp Refills    metoprolol succinate XL (TOPROL-XL) 25 MG 24 hr tablet 90 tablet 0     Sig: Take 1 tablet by mouth Daily.      Last office visit with prescribing clinician: 6/17/2024   Last telemedicine visit with prescribing clinician: Visit date not found   Next office visit with prescribing clinician: 6/19/2025                         Would you like a call back once the refill request has been completed: [] Yes [] No    If the office needs to give you a call back, can they leave a voicemail: [] Yes [] No    Shanique Medina MA  04/14/25, 14:57 EDT